# Patient Record
Sex: MALE | Race: WHITE | Employment: UNEMPLOYED | URBAN - METROPOLITAN AREA
[De-identification: names, ages, dates, MRNs, and addresses within clinical notes are randomized per-mention and may not be internally consistent; named-entity substitution may affect disease eponyms.]

---

## 2023-08-21 ENCOUNTER — APPOINTMENT (OUTPATIENT)
Dept: CT IMAGING | Age: 53
End: 2023-08-21

## 2023-08-21 ENCOUNTER — HOSPITAL ENCOUNTER (EMERGENCY)
Age: 53
Discharge: HOME | End: 2023-08-21
Attending: EMERGENCY MEDICINE

## 2023-08-21 VITALS
DIASTOLIC BLOOD PRESSURE: 89 MMHG | BODY MASS INDEX: 23.8 KG/M2 | OXYGEN SATURATION: 95 % | RESPIRATION RATE: 16 BRPM | TEMPERATURE: 98.7 F | HEART RATE: 108 BPM | WEIGHT: 175.71 LBS | HEIGHT: 72 IN | SYSTOLIC BLOOD PRESSURE: 130 MMHG

## 2023-08-21 DIAGNOSIS — S01.412A CHEEK LACERATION, LEFT, INITIAL ENCOUNTER: ICD-10-CM
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
## 2023-08-21 DIAGNOSIS — F10.920 ACUTE ALCOHOLIC INTOXICATION WITHOUT COMPLICATION (HCC): Primary | ICD-10-CM

## 2023-08-21 DIAGNOSIS — S02.2XXA CLOSED FRACTURE OF NASAL BONE, INITIAL ENCOUNTER: ICD-10-CM

## 2023-08-21 DIAGNOSIS — S01.112A LACERATION OF LEFT EYEBROW, INITIAL ENCOUNTER: ICD-10-CM

## 2023-08-21 PROCEDURE — 72125 CT NECK SPINE W/O DYE: CPT

## 2023-08-21 PROCEDURE — 99284 EMERGENCY DEPT VISIT MOD MDM: CPT

## 2023-08-21 PROCEDURE — 12013 RPR F/E/E/N/L/M 2.6-5.0 CM: CPT

## 2023-08-21 PROCEDURE — 70450 CT HEAD/BRAIN W/O DYE: CPT

## 2023-08-21 PROCEDURE — 70486 CT MAXILLOFACIAL W/O DYE: CPT

## 2023-08-21 ASSESSMENT — ENCOUNTER SYMPTOMS
SORE THROAT: 0
CONSTIPATION: 0
NAUSEA: 0
ABDOMINAL PAIN: 0
CHEST TIGHTNESS: 0
PHOTOPHOBIA: 0
DIARRHEA: 0
SHORTNESS OF BREATH: 0
VOMITING: 0
FACIAL SWELLING: 1

## 2023-08-21 ASSESSMENT — PAIN DESCRIPTION - LOCATION: LOCATION: GENERALIZED

## 2023-08-21 ASSESSMENT — PAIN DESCRIPTION - PAIN TYPE: TYPE: ACUTE PAIN

## 2023-08-21 NOTE — ED NOTES
EMD already sutured lacerations. Lacerations cleaned again with hibiclens and saline. Will not allow bandage to be placed or antibiotic ointment to be placed on lacerations. Refuses tetanus shot saying he has had 3 in past year.         Vern Mcclellan RN  08/21/23 3031

## 2023-08-21 NOTE — DISCHARGE INSTRUCTIONS
Call today or tomorrow to follow up with [address] [place] [address] emergency room in 7 days for wound check and suture removal.     Use ibuprofen or Tylenol (unless prescribed medications that have Tylenol in it) for pain. You can take over the counter Ibuprofen (advil) tablets (4 tablets every 8 hours or 3 tablets every 6 hours or 2 tablets every 4 hours)    You can shower with the laceration, would avoid baths or swimming in lakes / rivers. Apply bacitracin / triple antibiotic ointment / Neosporin to the wound twice a day. Place sunscreen on the healing wound for the next year to help with scarring. Return to the emergency department for worsening of pain, fever > 101.5, redness around the wound or redness streaking up the body part, white drainage from wound.

## 2023-08-21 NOTE — ED NOTES
[id number]  Standing. Gait fairly steady. Insists on leaving. Wants to smoke. EMD and this RN trying to encourage pt to stay till CT scans are read. Will not sit down or get back on stretcher. Constantly insists on leaving.       to bedside      Evelio Camp RN  08/21/23 4505

## 2023-08-21 NOTE — ED PROVIDER NOTES
cannot smoke inside and he cannot smoke on this campus and he needs to wait till he is discharged to smoke. [DS]   1247 Patient CT facial bones with nasal bone fracture with no other acute fractures. Patient CT head with no acute abnormalities. CT cervical spine with mild sclerotic appearance of C4 although patient has no tenderness in this area. No other signs of injury or fracture. Patient ambulatory here in the emergency room with a steady gait. Tolerating oral intake. Plan for discharge with outpatient follow-up. Stressed importance close follow-up in 7 days for suture removal and wound check. Patient agreeable. Return instructions provided. All questions answered prior to discharge. [DS]      ED Course User Index  [DS] Phong Garcia MD     I estimate there is LOW risk for SUBARACHNOID HEMORRHAGE, MENINGITIS, INTRACRANIAL HEMORRHAGE, SUBDURAL HEMATOMA, OR STROKE, thus I consider the discharge disposition reasonable. [address] and I have discussed the diagnosis and risks, and we agree with discharging home to follow-up with their primary doctor. We also discussed returning to the Emergency Department immediately if new or worsening symptoms occur. We have discussed the symptoms which are most concerning (e.g., changing or worsening pain, weakness, vomiting, fever) that necessitate immediate return. I PERSONALLY SAW THE PATIENT AND PERFORMED A SUBSTANTIVE PORTION OF THE VISIT INCLUDING ALL ASPECTS OF THE MEDICAL DECISION MAKING PROCESS. The primary clinician of record Phong Garcia MD    CRITICAL CARE TIME   Total Critical Caretime was 35 minutes, excluding separately reportable procedures. There was a high probability of clinically significant/life threatening deterioration in the patient's condition which required my urgent intervention.       CRITICAL CARE  I personally saw the patient and independently provided 35 minutes of non-concurrent critical care out of the total shared critical

## 2023-08-23 NOTE — ED NOTES
Just wants to leave. This RN and EMD talking with pt a lot , encouraging him to stay till CT scans are read. This RN cleaning lacerations. also arms/neck/face cleaned (dried blood). Moves all extremities without any problem.            Lynn Juárez RN  08/23/23 6749
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
## 2023-08-23 NOTE — ED NOTES
Unable to reassess pain. Mildly uncooperative at times because he is fixated on leaving the ED.      Brian Trejo RN  08/23/23 9446

## 2023-08-23 NOTE — ED NOTES
200-5   EMD now discharging pt. Has been eating and drinking a little. \"I just want to leave. \"   Doesn't have anyone to pick him up-this RN has been trying to get pt to find a ride back to [facility] (where he stays) since he arrived. Pt seems to be at his baseline. Steady on his feet. Conversation appropriate. Just reports some pain to facial lacerations. Doesn't rate pain. Discharged ambulatory. [facility] transportation provided to get back to [facility]. This RN walked with pt out and supervised him getting into [facility] safely. Hasn't been cooperative keeping BP cuff/pulse oximeter in place. Doesn't want VS taken.    EMD ok with pt being discharged with pulse  over [address] [facility], RN  08/23/23 [address] [facility], RN  08/23/23 [address] Mitzi Carrasco, JOSE ALBERTO  08/23/23 1448